# Patient Record
Sex: MALE | Race: WHITE | NOT HISPANIC OR LATINO | Employment: OTHER | URBAN - NONMETROPOLITAN AREA
[De-identification: names, ages, dates, MRNs, and addresses within clinical notes are randomized per-mention and may not be internally consistent; named-entity substitution may affect disease eponyms.]

---

## 2023-07-19 LAB
ANION GAP IN SER/PLAS: 9 MMOL/L (ref 10–20)
CALCIUM (MG/DL) IN SER/PLAS: 9.6 MG/DL (ref 8.6–10.3)
CARBON DIOXIDE, TOTAL (MMOL/L) IN SER/PLAS: 30 MMOL/L (ref 21–32)
CHLORIDE (MMOL/L) IN SER/PLAS: 103 MMOL/L (ref 98–107)
CHOLESTEROL (MG/DL) IN SER/PLAS: 164 MG/DL (ref 0–199)
CHOLESTEROL IN HDL (MG/DL) IN SER/PLAS: 52 MG/DL
CHOLESTEROL/HDL RATIO: 3.2
CREATININE (MG/DL) IN SER/PLAS: 0.89 MG/DL (ref 0.5–1.3)
GFR MALE: >90 ML/MIN/1.73M2
GLUCOSE (MG/DL) IN SER/PLAS: 99 MG/DL (ref 74–99)
LDL: 92 MG/DL (ref 0–99)
POTASSIUM (MMOL/L) IN SER/PLAS: 4.1 MMOL/L (ref 3.5–5.3)
PROSTATE SPECIFIC ANTIGEN,SCREEN: 2.19 NG/ML (ref 0–4)
SODIUM (MMOL/L) IN SER/PLAS: 138 MMOL/L (ref 136–145)
TRIGLYCERIDE (MG/DL) IN SER/PLAS: 98 MG/DL (ref 0–149)
UREA NITROGEN (MG/DL) IN SER/PLAS: 18 MG/DL (ref 6–23)
VLDL: 20 MG/DL (ref 0–40)

## 2023-07-24 ENCOUNTER — TELEPHONE (OUTPATIENT)
Dept: PRIMARY CARE | Facility: CLINIC | Age: 68
End: 2023-07-24

## 2023-07-24 ENCOUNTER — OFFICE VISIT (OUTPATIENT)
Dept: PRIMARY CARE | Facility: CLINIC | Age: 68
End: 2023-07-24
Payer: MEDICARE

## 2023-07-24 VITALS
HEIGHT: 69 IN | SYSTOLIC BLOOD PRESSURE: 118 MMHG | OXYGEN SATURATION: 98 % | BODY MASS INDEX: 21.8 KG/M2 | DIASTOLIC BLOOD PRESSURE: 82 MMHG | WEIGHT: 147.2 LBS | HEART RATE: 71 BPM

## 2023-07-24 DIAGNOSIS — Z12.5 ENCOUNTER FOR SCREENING FOR MALIGNANT NEOPLASM OF PROSTATE: ICD-10-CM

## 2023-07-24 DIAGNOSIS — Z00.00 ROUTINE GENERAL MEDICAL EXAMINATION AT HEALTH CARE FACILITY: Primary | ICD-10-CM

## 2023-07-24 DIAGNOSIS — E78.00 HIGH CHOLESTEROL: ICD-10-CM

## 2023-07-24 DIAGNOSIS — Z00.00 HEALTHCARE MAINTENANCE: ICD-10-CM

## 2023-07-24 DIAGNOSIS — E78.00 HIGH BLOOD CHOLESTEROL: Primary | ICD-10-CM

## 2023-07-24 DIAGNOSIS — F41.9 ANXIETY DISORDER, UNSPECIFIED TYPE: ICD-10-CM

## 2023-07-24 PROCEDURE — 1160F RVW MEDS BY RX/DR IN RCRD: CPT | Performed by: FAMILY MEDICINE

## 2023-07-24 PROCEDURE — 1036F TOBACCO NON-USER: CPT | Performed by: FAMILY MEDICINE

## 2023-07-24 PROCEDURE — 1170F FXNL STATUS ASSESSED: CPT | Performed by: FAMILY MEDICINE

## 2023-07-24 PROCEDURE — 99213 OFFICE O/P EST LOW 20 MIN: CPT | Performed by: FAMILY MEDICINE

## 2023-07-24 PROCEDURE — G0439 PPPS, SUBSEQ VISIT: HCPCS | Performed by: FAMILY MEDICINE

## 2023-07-24 PROCEDURE — 1159F MED LIST DOCD IN RCRD: CPT | Performed by: FAMILY MEDICINE

## 2023-07-24 RX ORDER — PRAVASTATIN SODIUM 40 MG/1
40 TABLET ORAL DAILY
Qty: 30 TABLET | Refills: 3 | Status: SHIPPED | OUTPATIENT
Start: 2023-07-24 | End: 2023-12-07 | Stop reason: SDUPTHER

## 2023-07-24 RX ORDER — PRAVASTATIN SODIUM 40 MG/1
40 TABLET ORAL DAILY
COMMUNITY
End: 2023-07-24 | Stop reason: SDUPTHER

## 2023-07-24 RX ORDER — PAROXETINE HYDROCHLORIDE 20 MG/1
20 TABLET, FILM COATED ORAL DAILY
Qty: 30 TABLET | Refills: 3 | Status: SHIPPED | OUTPATIENT
Start: 2023-07-24 | End: 2023-12-07 | Stop reason: SDUPTHER

## 2023-07-24 RX ORDER — PAROXETINE HYDROCHLORIDE 20 MG/1
20 TABLET, FILM COATED ORAL DAILY
COMMUNITY
End: 2023-07-24 | Stop reason: SDUPTHER

## 2023-07-24 ASSESSMENT — PATIENT HEALTH QUESTIONNAIRE - PHQ9
2. FEELING DOWN, DEPRESSED OR HOPELESS: NOT AT ALL
1. LITTLE INTEREST OR PLEASURE IN DOING THINGS: NOT AT ALL
SUM OF ALL RESPONSES TO PHQ9 QUESTIONS 1 AND 2: 0

## 2023-07-24 ASSESSMENT — ENCOUNTER SYMPTOMS
DEPRESSION: 0
LOSS OF SENSATION IN FEET: 1
OCCASIONAL FEELINGS OF UNSTEADINESS: 0

## 2023-07-24 ASSESSMENT — ACTIVITIES OF DAILY LIVING (ADL)
BATHING: INDEPENDENT
TAKING_MEDICATION: INDEPENDENT
DOING_HOUSEWORK: INDEPENDENT
DRESSING: INDEPENDENT
MANAGING_FINANCES: INDEPENDENT
GROCERY_SHOPPING: INDEPENDENT

## 2023-07-24 NOTE — PROGRESS NOTES
"Subjective   Reason for Visit: Joe Romero is an 68 y.o. male here for a Medicare Wellness visit.     Past Medical, Surgical, and Family History reviewed and updated in chart.    Reviewed all medications by prescribing practitioner or clinical pharmacist (such as prescriptions, OTCs, herbal therapies and supplements) and documented in the medical record.    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   Prostate cancer screening: Screening is current   Colorectal Cancer Screening: screening is current and 7/30/20.  States does not want to repeat  Abdominal Aortic Aneurysm screening: screening is current and 6/9/20.       Patient Care Team:  Andrae Parekh MD as PCP - General     Review of Systems    Objective   Vitals:  /82 (BP Location: Left arm, Patient Position: Sitting)   Pulse 71   Ht 1.753 m (5' 9\")   Wt 66.8 kg (147 lb 3.2 oz)   SpO2 98%   BMI 21.74 kg/m²       Physical Exam  Constitutional:       Appearance: Normal appearance.   HENT:      Head: Normocephalic and atraumatic.   Eyes:      Conjunctiva/sclera: Conjunctivae normal.      Pupils: Pupils are equal, round, and reactive to light.   Cardiovascular:      Rate and Rhythm: Normal rate and regular rhythm.      Heart sounds: Normal heart sounds.   Pulmonary:      Effort: Pulmonary effort is normal.      Breath sounds: Normal breath sounds.   Lymphadenopathy:      Cervical: No " cervical adenopathy.   Skin:     Coloration: Skin is not jaundiced.   Neurological:      General: No focal deficit present.      Mental Status: He is alert and oriented to person, place, and time.   Psychiatric:         Mood and Affect: Mood normal.         Behavior: Behavior normal.         Thought Content: Thought content normal.         Judgment: Judgment normal.         Assessment/Plan   Problem List Items Addressed This Visit    None  Visit Diagnoses       Routine general medical examination at health care facility    -  Primary    Healthcare maintenance        Relevant Orders    Prostate Specific Antigen, Screen    Encounter for screening for malignant neoplasm of prostate        Relevant Orders    Prostate Specific Antigen, Screen    High cholesterol        Relevant Orders    Basic Metabolic Panel

## 2023-07-24 NOTE — PROGRESS NOTES
Patient ID: Joe Romero is a 68 y.o. male.    ProceduresSubjective   Reason for Visit: Joe Romero is an 68 y.o. male here for a Medicare Wellness visit.     Past Medical, Surgical, and Family History reviewed and updated in chart.    Reviewed all medications by prescribing practitioner or clinical pharmacist (such as prescriptions, OTCs, herbal therapies and supplements) and documented in the medical record.    HPI    Patient Care Team:  Andrae Parekh MD as PCP - General     Review of Systems    Objective   Vitals:  There were no vitals taken for this visit.      Physical Exam    Assessment/Plan   Problem List Items Addressed This Visit    None

## 2023-09-14 ENCOUNTER — PATIENT OUTREACH (OUTPATIENT)
Dept: CARE COORDINATION | Facility: CLINIC | Age: 68
End: 2023-09-14
Payer: MEDICARE

## 2023-09-14 ENCOUNTER — DOCUMENTATION (OUTPATIENT)
Dept: PRIMARY CARE | Facility: CLINIC | Age: 68
End: 2023-09-14
Payer: MEDICARE

## 2023-09-14 RX ORDER — FOLIC ACID 1 MG/1
1 TABLET ORAL
COMMUNITY
Start: 2023-09-05 | End: 2023-10-05

## 2023-09-14 RX ORDER — AMIODARONE HYDROCHLORIDE 200 MG/1
200 TABLET ORAL
COMMUNITY
Start: 2023-09-05 | End: 2023-10-05

## 2023-09-14 RX ORDER — ACETAMINOPHEN 325 MG/1
650 TABLET ORAL EVERY 6 HOURS PRN
COMMUNITY
Start: 2023-09-04 | End: 2023-10-04

## 2023-09-14 RX ORDER — DOCUSATE SODIUM 100 MG/1
100 CAPSULE, LIQUID FILLED ORAL
COMMUNITY
Start: 2023-09-05 | End: 2023-10-05

## 2023-09-14 RX ORDER — PANTOPRAZOLE SODIUM 40 MG/1
40 TABLET, DELAYED RELEASE ORAL
COMMUNITY
Start: 2023-09-04 | End: 2023-10-04 | Stop reason: WASHOUT

## 2023-09-14 RX ORDER — METOPROLOL SUCCINATE 25 MG/1
12.5 TABLET, EXTENDED RELEASE ORAL
COMMUNITY
Start: 2023-09-05 | End: 2023-11-22 | Stop reason: SDUPTHER

## 2023-09-14 NOTE — PROGRESS NOTES
Discharge Facility: [facility]  Discharge Diagnosis: PE, DVT  Admission Date: 9/12/23  Discharge Date: 9/13/23    PCP Appointment Date: TBD daughter to call  Specialist Appointment Date:  9/18/23  Hospital Encounter and Summary: Linked   See discharge assessment below for further details    Engagement  Call Start Time: 1100 (9/14/2023 11:08 AM)    Medications  Medications reviewed with patient/caregiver?: Yes (9/14/2023 11:08 AM)  Is the patient having any side effects they believe may be caused by any medication additions or changes?: No (9/14/2023 11:08 AM)  Does the patient have all medications ordered at discharge?: Yes (9/14/2023 11:08 AM)  Care Management Interventions: Provided patient education (9/14/2023 11:08 AM)  Prescription Comments: on eliquis, brilinta, no aspirin (9/14/2023 11:08 AM)  Medication Comments: educated on bleeding risk and watching for dark stools, bleeding gums etc (9/14/2023 11:08 AM)    Appointments  Does the patient have a primary care provider?: Yes (9/14/2023 11:08 AM)  Care Management Interventions: Advised patient to make appointment (9/14/2023 11:08 AM)  Has the patient kept scheduled appointments due by today?: Yes (9/14/2023 11:08 AM)  Care Management Interventions: Educated on importance of keeping appointment (9/14/2023 11:08 AM)    Patient Teaching  Does the patient have access to their discharge instructions?: Yes (9/14/2023 11:08 AM)  Care Management Interventions: Reviewed instructions with patient (9/14/2023 11:08 AM)  What is the patient's perception of their health status since discharge?: Improving (9/14/2023 11:08 AM)  Is the patient/caregiver able to teach back the hierarchy of who to call/visit for symptoms/problems? PCP, Specialist, Home Health nurse, Urgent Care, ED, 911: Yes (9/14/2023 11:08 AM)    Wrap Up  Wrap Up Additional Comments: Patient states his daughter will be calling office to set up appt, doing well and no longer having shortness of  breath, educated on  medications and follow ups (9/14/2023 11:08 AM)  Call End Time: 1105 (9/14/2023 11:08 AM)

## 2023-09-19 ENCOUNTER — DOCUMENTATION (OUTPATIENT)
Dept: PRIMARY CARE | Facility: CLINIC | Age: 68
End: 2023-09-19
Payer: MEDICARE

## 2023-09-19 ENCOUNTER — PATIENT OUTREACH (OUTPATIENT)
Dept: CARE COORDINATION | Facility: CLINIC | Age: 68
End: 2023-09-19
Payer: MEDICARE

## 2023-09-19 NOTE — PROGRESS NOTES
Discharge Facility: [facility]  Discharge Diagnosis: GI bleed  Admission Date: 9/16/23  Discharge Date: 9/18/23    PCP Appointment Date: 9/22/23  Specialist Appointment Date:  10/4/23 cardiology  10/11/23 cardiology  10/17/23 vascular NP  GI pending  Hospital Encounter and Summary: Linked   See discharge assessment below for further details  Engagement  Call Start Time: 1045 (9/19/2023 10:52 AM)    Medications  Medications reviewed with patient/caregiver?: Yes (9/19/2023 10:52 AM)  Is the patient having any side effects they believe may be caused by any medication additions or changes?: No (9/19/2023 10:52 AM)  Does the patient have all medications ordered at discharge?: Yes (9/19/2023 10:52 AM)  Care Management Interventions: Provided patient education (9/19/2023 10:52 AM)  Prescription Comments: Back on elquis restarted the loading dose for 7 days then one tab twice daily (9/19/2023 10:52 AM)  Is the patient taking all medications as directed (includes completed medication regime)?: Yes (9/19/2023 10:52 AM)  Care Management Interventions: Provided patient education (9/19/2023 10:52 AM)  Medication Comments: educated on bleeding risk and watching for dark stools, bleeding gums etc (9/14/2023 11:08 AM)    Appointments  Does the patient have a primary care provider?: Yes (9/19/2023 10:52 AM)  Care Management Interventions: Verified appointment date/time/provider (9/19/2023 10:52 AM)  Has the patient kept scheduled appointments due by today?: Yes (9/14/2023 11:08 AM)  Care Management Interventions: Advised to schedule with specialist (9/19/2023 10:52 AM)    Patient Teaching  Does the patient have access to their discharge instructions?: Yes (9/19/2023 10:52 AM)  Care Management Interventions: Reviewed instructions with patient (9/19/2023 10:52 AM)  What is the patient's perception of their health status since discharge?: Returned to baseline/stable (9/19/2023 10:52 AM)  Is the patient/caregiver able to teach  back the hierarchy of who to call/visit for symptoms/problems? PCP, Specialist, Home Health nurse, Urgent Care, ED, 911: Yes (9/19/2023 10:52 AM)  Patient/Caregiver Education Comments: Aware to continue monitoring for dark stools as he had been, bleeding gums, bruising easily and seek care with any concerns (9/19/2023 10:52 AM)    Wrap Up  Wrap Up Additional Comments: Had EGD and also IVC filter placed 9/17, will see vascular surg for follow up as well as cardiology from previous surgery. Daughter will schedule GI follow up (9/19/2023 10:52 AM)  Call End Time: 1055 (9/19/2023 10:52 AM)

## 2023-09-22 ENCOUNTER — LAB (OUTPATIENT)
Dept: LAB | Facility: LAB | Age: 68
End: 2023-09-22
Payer: MEDICARE

## 2023-09-22 ENCOUNTER — OFFICE VISIT (OUTPATIENT)
Dept: PRIMARY CARE | Facility: CLINIC | Age: 68
End: 2023-09-22
Payer: MEDICARE

## 2023-09-22 VITALS
DIASTOLIC BLOOD PRESSURE: 70 MMHG | BODY MASS INDEX: 22.02 KG/M2 | HEIGHT: 69 IN | OXYGEN SATURATION: 99 % | HEART RATE: 65 BPM | WEIGHT: 148.7 LBS | SYSTOLIC BLOOD PRESSURE: 110 MMHG

## 2023-09-22 DIAGNOSIS — I82.409 DEEP VEIN THROMBOSIS (DVT) WITH PULMONARY EMBOLISM PRESENT ON ADMISSION (MULTI): ICD-10-CM

## 2023-09-22 DIAGNOSIS — K92.1 GASTROINTESTINAL HEMORRHAGE WITH MELENA: Primary | ICD-10-CM

## 2023-09-22 DIAGNOSIS — K92.1 GASTROINTESTINAL HEMORRHAGE WITH MELENA: ICD-10-CM

## 2023-09-22 DIAGNOSIS — I25.10 CORONARY ARTERY DISEASE INVOLVING NATIVE CORONARY ARTERY OF NATIVE HEART WITHOUT ANGINA PECTORIS: ICD-10-CM

## 2023-09-22 DIAGNOSIS — I26.99 DEEP VEIN THROMBOSIS (DVT) WITH PULMONARY EMBOLISM PRESENT ON ADMISSION (MULTI): ICD-10-CM

## 2023-09-22 LAB
ANION GAP IN SER/PLAS: 13 MMOL/L (ref 10–20)
CALCIUM (MG/DL) IN SER/PLAS: 8.2 MG/DL (ref 8.6–10.3)
CARBON DIOXIDE, TOTAL (MMOL/L) IN SER/PLAS: 26 MMOL/L (ref 21–32)
CHLORIDE (MMOL/L) IN SER/PLAS: 103 MMOL/L (ref 98–107)
CREATININE (MG/DL) IN SER/PLAS: 0.79 MG/DL (ref 0.5–1.3)
ERYTHROCYTE DISTRIBUTION WIDTH (RATIO) BY AUTOMATED COUNT: 16.6 % (ref 11.5–14.5)
ERYTHROCYTE MEAN CORPUSCULAR HEMOGLOBIN CONCENTRATION (G/DL) BY AUTOMATED: 29.8 G/DL (ref 32–36)
ERYTHROCYTE MEAN CORPUSCULAR VOLUME (FL) BY AUTOMATED COUNT: 100 FL (ref 80–100)
ERYTHROCYTES (10*6/UL) IN BLOOD BY AUTOMATED COUNT: 3.22 X10E12/L (ref 4.5–5.9)
GFR MALE: >90 ML/MIN/1.73M2
GLUCOSE (MG/DL) IN SER/PLAS: 107 MG/DL (ref 74–99)
HEMATOCRIT (%) IN BLOOD BY AUTOMATED COUNT: 32.2 % (ref 41–52)
HEMOGLOBIN (G/DL) IN BLOOD: 9.6 G/DL (ref 13.5–17.5)
LEUKOCYTES (10*3/UL) IN BLOOD BY AUTOMATED COUNT: 10.4 X10E9/L (ref 4.4–11.3)
PLATELETS (10*3/UL) IN BLOOD AUTOMATED COUNT: 529 X10E9/L (ref 150–450)
POTASSIUM (MMOL/L) IN SER/PLAS: 4.6 MMOL/L (ref 3.5–5.3)
SODIUM (MMOL/L) IN SER/PLAS: 137 MMOL/L (ref 136–145)
UREA NITROGEN (MG/DL) IN SER/PLAS: 14 MG/DL (ref 6–23)

## 2023-09-22 PROCEDURE — 1159F MED LIST DOCD IN RCRD: CPT | Performed by: FAMILY MEDICINE

## 2023-09-22 PROCEDURE — 99214 OFFICE O/P EST MOD 30 MIN: CPT | Performed by: FAMILY MEDICINE

## 2023-09-22 PROCEDURE — 36415 COLL VENOUS BLD VENIPUNCTURE: CPT

## 2023-09-22 PROCEDURE — 1160F RVW MEDS BY RX/DR IN RCRD: CPT | Performed by: FAMILY MEDICINE

## 2023-09-22 PROCEDURE — 1036F TOBACCO NON-USER: CPT | Performed by: FAMILY MEDICINE

## 2023-09-22 PROCEDURE — 80048 BASIC METABOLIC PNL TOTAL CA: CPT

## 2023-09-22 PROCEDURE — 85027 COMPLETE CBC AUTOMATED: CPT

## 2023-09-22 NOTE — PROGRESS NOTES
"Subjective   Patient ID: Joe Romero is a 68 y.o. male who presents for Hospital Follow-up.    HPI     GI bleed   9/16/to 9/19     Patient had acute MI about a month ago had 3 blood vessel bypass.  Was sent home on aspirin  Patient and developed an upper GI bleed.  He has had a inferior vena cava filter placed currently on Eliquis Brilinta not taking aspirin.  He is finally starting to feel better appetite is improving his stools are dark due to the iron that he is taking.    Patient is here with his daughter.  We will recheck his CBC BMP for monitoring of his hemoglobin and history of hyperkalemia.  Poor appetite   Last bm yesterday   Black     Brilinina eliquis   Provoked dvt pe after surgery  treat min of 6 months     Protonix x 2 months  minimum   Off asa   See cardiology Wednesday     Review of Systems      Objective   /70   Pulse 65   Ht 1.753 m (5' 9\")   Wt 67.4 kg (148 lb 11.2 oz)   SpO2 99%   BMI 21.96 kg/m²     Physical Exam  Vitals reviewed.   Constitutional:       General: He is not in acute distress.     Appearance: Normal appearance.   HENT:      Head: Normocephalic and atraumatic.   Eyes:      Conjunctiva/sclera: Conjunctivae normal.   Cardiovascular:      Rate and Rhythm: Normal rate and regular rhythm.      Heart sounds: No murmur heard.  Pulmonary:      Effort: Pulmonary effort is normal.      Breath sounds: Normal breath sounds.   Abdominal:      General: Abdomen is flat. Bowel sounds are normal.      Palpations: Abdomen is soft. There is no mass.   Lymphadenopathy:      Cervical: No cervical adenopathy.   Skin:     General: Skin is warm and dry.      Comments: Cabg incisions well healed    Neurological:      General: No focal deficit present.      Mental Status: He is alert and oriented to person, place, and time.   Psychiatric:         Mood and Affect: Mood normal.         Judgment: Judgment normal.         Assessment/Plan   Diagnoses and all orders for this " visit:  Gastrointestinal hemorrhage with melena  -     Basic Metabolic Panel; Future  -     CBC; Future  Coronary artery disease involving native coronary artery of native heart without angina pectoris  Deep vein thrombosis (DVT) with pulmonary embolism present on admission (CMS/[facility])   Stable

## 2023-09-27 ENCOUNTER — PATIENT OUTREACH (OUTPATIENT)
Dept: CARE COORDINATION | Facility: CLINIC | Age: 68
End: 2023-09-27
Payer: MEDICARE

## 2023-09-27 DIAGNOSIS — Z12.11 COLON CANCER SCREENING: Primary | ICD-10-CM

## 2023-09-27 NOTE — PROGRESS NOTES
Unable to reach patient for call back after patient's follow up appointment with PCP.   KRISTENM with call back number for patient to call if needed   If no voicemail available call attempts x 2 were made to contact the patient to assist with any questions or concerns patient may have.

## 2023-10-31 ENCOUNTER — PATIENT OUTREACH (OUTPATIENT)
Dept: CARE COORDINATION | Facility: CLINIC | Age: 68
End: 2023-10-31
Payer: MEDICARE

## 2023-11-02 ENCOUNTER — TELEPHONE (OUTPATIENT)
Dept: PRIMARY CARE | Facility: CLINIC | Age: 68
End: 2023-11-02
Payer: MEDICARE

## 2023-11-02 DIAGNOSIS — R12 HEART BURN: ICD-10-CM

## 2023-11-02 RX ORDER — PANTOPRAZOLE SODIUM 40 MG/1
TABLET, DELAYED RELEASE ORAL
Qty: 90 TABLET | Refills: 1 | Status: SHIPPED | OUTPATIENT
Start: 2023-11-02 | End: 2024-05-06 | Stop reason: SDUPTHER

## 2023-11-02 RX ORDER — PANTOPRAZOLE SODIUM 40 MG/1
TABLET, DELAYED RELEASE ORAL
COMMUNITY
Start: 2023-10-06 | End: 2023-11-02 | Stop reason: SDUPTHER

## 2023-11-14 ENCOUNTER — TELEPHONE (OUTPATIENT)
Dept: PRIMARY CARE | Facility: CLINIC | Age: 68
End: 2023-11-14
Payer: MEDICARE

## 2023-11-16 ENCOUNTER — TELEPHONE (OUTPATIENT)
Dept: PRIMARY CARE | Facility: CLINIC | Age: 68
End: 2023-11-16
Payer: MEDICARE

## 2023-11-16 DIAGNOSIS — I25.10 CORONARY ARTERY DISEASE INVOLVING NATIVE CORONARY ARTERY OF NATIVE HEART WITHOUT ANGINA PECTORIS: ICD-10-CM

## 2023-11-16 DIAGNOSIS — I82.409 DEEP VEIN THROMBOSIS (DVT) WITH PULMONARY EMBOLISM PRESENT ON ADMISSION (MULTI): ICD-10-CM

## 2023-11-16 DIAGNOSIS — I26.99 DEEP VEIN THROMBOSIS (DVT) WITH PULMONARY EMBOLISM PRESENT ON ADMISSION (MULTI): ICD-10-CM

## 2023-11-22 ENCOUNTER — OFFICE VISIT (OUTPATIENT)
Dept: PRIMARY CARE | Facility: CLINIC | Age: 68
End: 2023-11-22
Payer: MEDICARE

## 2023-11-22 VITALS
SYSTOLIC BLOOD PRESSURE: 118 MMHG | WEIGHT: 156.4 LBS | BODY MASS INDEX: 23.1 KG/M2 | OXYGEN SATURATION: 96 % | DIASTOLIC BLOOD PRESSURE: 70 MMHG | HEART RATE: 55 BPM

## 2023-11-22 DIAGNOSIS — I25.10 CORONARY ARTERY DISEASE INVOLVING NATIVE CORONARY ARTERY OF NATIVE HEART WITHOUT ANGINA PECTORIS: ICD-10-CM

## 2023-11-22 DIAGNOSIS — K92.2 UPPER GI BLEED: Primary | ICD-10-CM

## 2023-11-22 PROCEDURE — 99214 OFFICE O/P EST MOD 30 MIN: CPT | Performed by: FAMILY MEDICINE

## 2023-11-22 PROCEDURE — 1159F MED LIST DOCD IN RCRD: CPT | Performed by: FAMILY MEDICINE

## 2023-11-22 PROCEDURE — 1036F TOBACCO NON-USER: CPT | Performed by: FAMILY MEDICINE

## 2023-11-22 PROCEDURE — 1160F RVW MEDS BY RX/DR IN RCRD: CPT | Performed by: FAMILY MEDICINE

## 2023-11-22 RX ORDER — METOPROLOL SUCCINATE 25 MG/1
12.5 TABLET, EXTENDED RELEASE ORAL
Qty: 45 TABLET | Refills: 1 | Status: SHIPPED | OUTPATIENT
Start: 2023-11-22 | End: 2024-05-22 | Stop reason: SDUPTHER

## 2023-11-22 ASSESSMENT — PATIENT HEALTH QUESTIONNAIRE - PHQ9
1. LITTLE INTEREST OR PLEASURE IN DOING THINGS: NOT AT ALL
SUM OF ALL RESPONSES TO PHQ9 QUESTIONS 1 AND 2: 0
2. FEELING DOWN, DEPRESSED OR HOPELESS: NOT AT ALL

## 2023-11-22 NOTE — PROGRESS NOTES
Subjective   Patient ID: Joe Romero is a 68 y.o. male who presents for Follow-up (PT is here for 2 month OV. Would like PCP to manage met. Rx ).    HPI   GI bleed on asa  9/16/to 9/19 /23     H/o acute MI with 3 blood vessel bypass. 2023.  Efrain gue[place]   No cp still has some ant chest wall pain from incision     Provoked dvt pe after surgery  treat min of 6 months      Protonix x 2 months  minimum   Off asa still  Needs repeat EGD to get IVC out  Last cbc 8.6 11/10/23    Review of Systems    Objective   /70   Pulse 55   Wt 70.9 kg (156 lb 6.4 oz)   SpO2 96%   BMI 23.10 kg/m²     Physical Exam  Constitutional:       Appearance: Normal appearance.   HENT:      Head: Normocephalic and atraumatic.   Eyes:      Conjunctiva/sclera: Conjunctivae normal.      Pupils: Pupils are equal, round, and reactive to light.   Cardiovascular:      Rate and Rhythm: Normal rate and regular rhythm.      Heart sounds: Normal heart sounds.   Pulmonary:      Effort: Pulmonary effort is normal.      Breath sounds: Normal breath sounds.   Lymphadenopathy:      Cervical: No cervical adenopathy.   Skin:     Coloration: Skin is not jaundiced.   Neurological:      General: No focal deficit present.      Mental Status: He is alert and oriented to person, place, and time.   Psychiatric:         Mood and Affect: Mood normal.         Behavior: Behavior normal.         Thought Content: Thought content normal.         Judgment: Judgment normal.         Assessment/Plan   Diagnoses and all orders for this visit:  Upper GI bleed  -     Referral to Gastroenterology; Future  Coronary artery disease involving native coronary artery of native heart without angina pectoris  -     metoprolol succinate XL (Toprol-XL) 25 mg 24 hr tablet; Take 0.5 tablets (12.5 mg) by mouth once daily.

## 2023-11-27 DIAGNOSIS — K92.2 UPPER GI BLEED: Primary | ICD-10-CM

## 2023-11-27 DIAGNOSIS — F41.9 ANXIETY DISORDER, UNSPECIFIED TYPE: ICD-10-CM

## 2023-11-27 DIAGNOSIS — E78.00 HIGH BLOOD CHOLESTEROL: ICD-10-CM

## 2023-11-27 NOTE — TELEPHONE ENCOUNTER
11/27/23 Dr. Benitez's office left a vm stating that he reviewed the patient's chart and he is going to decline scheduling an appointment with this patient.  He stated that the patient needs to schedule with a surgeon.   Referral from 11/22/23 Gastroenterology

## 2023-11-28 NOTE — TELEPHONE ENCOUNTER
Called patients daughter and spoke with her and let her know the General surgeons name and phone number

## 2023-12-07 ENCOUNTER — PATIENT OUTREACH (OUTPATIENT)
Dept: CARE COORDINATION | Facility: CLINIC | Age: 68
End: 2023-12-07
Payer: MEDICARE

## 2023-12-07 RX ORDER — PRAVASTATIN SODIUM 40 MG/1
40 TABLET ORAL DAILY
Qty: 30 TABLET | Refills: 11 | Status: SHIPPED | OUTPATIENT
Start: 2023-12-07 | End: 2024-05-02 | Stop reason: WASHOUT

## 2023-12-07 RX ORDER — PAROXETINE HYDROCHLORIDE 20 MG/1
20 TABLET, FILM COATED ORAL DAILY
Qty: 30 TABLET | Refills: 11 | Status: SHIPPED | OUTPATIENT
Start: 2023-12-07

## 2023-12-07 NOTE — TELEPHONE ENCOUNTER
[facility] is saying they do not have refills on these meds. Please refill as soon as possible, Patient states they have called several times. Please call patient when this is done.    pravastatin (Pravachol) 40 mg tablet [[id number]]    Order Details  Dose: 40 mg Route: oral Frequency: Daily   Dispense Quantity: 30 tablet Refills: 3          Sig: Take 1 tablet (40 mg) by mouth once daily.         Start Date: 07/24/23 End Date: --   Written Date: 07/24/23       PARoxetine (Paxil) 20 mg tablet [[id number]]    Order Details  Dose: 20 mg Route: oral Frequency: Daily   Dispense Quantity: 30 tablet Refills: 3          Sig: Take 1 tablet (20 mg) by mouth once daily.         Start Date: 07/24/23 End Date: --   Written Date: 07/24/23 Rx Expiration Date: 07/23/24        Associated Diagnoses: Anxiety disorder, unspecified type [F41.9]   Original Order: PARoxetine (Paxil) 20 mg tablet [[id number]]

## 2023-12-07 NOTE — TELEPHONE ENCOUNTER
Patient requesting med refills on pravastatin and praoxetine  [facility]  Last OV 11/22/23  No upcoming appointment schedule  Orders pended send if ok

## 2024-01-15 ENCOUNTER — DOCUMENTATION (OUTPATIENT)
Dept: PRIMARY CARE | Facility: CLINIC | Age: 69
End: 2024-01-15
Payer: MEDICARE

## 2024-01-16 ENCOUNTER — PATIENT OUTREACH (OUTPATIENT)
Dept: PRIMARY CARE | Facility: CLINIC | Age: 69
End: 2024-01-16
Payer: MEDICARE

## 2024-01-16 NOTE — PROGRESS NOTES
Discharge Facility: [facility]  Discharge Diagnosis: weakness and abnormal labs  Admission Date: 1/10/2024  Discharge Date: 1/13/2024    PCP Appointment Date: none-task  Specialist Appointment Date:  surgeon 1/16/2024  Vascular 3/11/2024  Hospital Encounter and Summary: Linked   See discharge assessment below for further details    Engagement  Call Start Time: 1351 (1/16/2024  1:51 PM)    Medications  Medications reviewed with patient/caregiver?: Yes (1/16/2024  1:51 PM)  Is the patient having any side effects they believe may be caused by any medication additions or changes?: No (1/16/2024  1:51 PM)  Care Management Interventions: No intervention needed (1/16/2024  1:51 PM)  Prescription Comments: no new meds (1/16/2024  1:51 PM)  Is the patient taking all medications as directed (includes completed medication regime)?: Yes (1/16/2024  1:51 PM)  Medication Comments: see med list (1/16/2024  1:51 PM)    Appointments  Does the patient have a primary care provider?: Yes (1/16/2024  1:51 PM)  Care Management Interventions: Advised patient to make appointment (1/16/2024  1:51 PM)  Has the patient kept scheduled appointments due by today?: Yes (1/16/2024  1:51 PM)  Care Management Interventions: Advised patient to keep appointment (1/16/2024  1:51 PM)    Self Management  What is the home health agency?: none (1/16/2024  1:51 PM)  Has home health visited the patient within 72 hours of discharge?: Not applicable (1/16/2024  1:51 PM)    Patient Teaching  Does the patient have access to their discharge instructions?: Yes (1/16/2024  1:51 PM)  Care Management Interventions: Reviewed instructions with patient (1/16/2024  1:51 PM)  What is the patient's perception of their health status since discharge?: Improving (1/16/2024  1:51 PM)  Is the patient/caregiver able to teach back the hierarchy of who to call/visit for symptoms/problems? PCP, Specialist, Home Health nurse, Urgent Care, ED, 911: Yes (1/16/2024  1:51 PM)    Wrap  Up  Wrap Up Additional Comments: Patient was admitted to [facility] 1/10/2024 for weakness and abnormal labs. Discharged on 1/13/2024 with no new meds. CTS spoke with patient. He stated that he is doing better. No signs of bleeding. He has an appt with general surgery today 1/16/2024. He is aware that if he notices any bleeding he is to stop the Eliquis. Denies any chest pains or shortness of breath. Patient does not want to schedule an appt with PCP at this time. I will task the office staff to let them know he has been discharged. No questions or concerns at this time. (1/16/2024  1:51 PM)  Call End Time: 1356 (1/16/2024  1:51 PM)

## 2024-01-26 ENCOUNTER — PATIENT OUTREACH (OUTPATIENT)
Dept: PRIMARY CARE | Facility: CLINIC | Age: 69
End: 2024-01-26
Payer: MEDICARE

## 2024-02-26 ENCOUNTER — PATIENT OUTREACH (OUTPATIENT)
Dept: PRIMARY CARE | Facility: CLINIC | Age: 69
End: 2024-02-26
Payer: MEDICARE

## 2024-04-01 ENCOUNTER — PATIENT OUTREACH (OUTPATIENT)
Dept: PRIMARY CARE | Facility: CLINIC | Age: 69
End: 2024-04-01
Payer: MEDICARE

## 2024-04-25 DIAGNOSIS — R12 HEART BURN: ICD-10-CM

## 2024-04-25 RX ORDER — PANTOPRAZOLE SODIUM 40 MG/1
TABLET, DELAYED RELEASE ORAL
Qty: 90 TABLET | Refills: 0 | OUTPATIENT
Start: 2024-04-25

## 2024-04-29 ENCOUNTER — DOCUMENTATION (OUTPATIENT)
Dept: PRIMARY CARE | Facility: CLINIC | Age: 69
End: 2024-04-29
Payer: MEDICARE

## 2024-04-30 ENCOUNTER — PATIENT OUTREACH (OUTPATIENT)
Dept: PRIMARY CARE | Facility: CLINIC | Age: 69
End: 2024-04-30
Payer: MEDICARE

## 2024-04-30 NOTE — PROGRESS NOTES
Discharge Facility: [facility]  Discharge Diagnosis: GI bleed  Admission Date: 4/26/2024  Discharge Date: 4/27/2024    PCP Appointment Date: 5/2/2024  Specialist Appointment Date: none  Hospital Encounter and Summary:  not available at this time      Recommended Outpatient Testing  Hold Plavix and Eliquis x 1 week.   Follow up with Dr. Barron.     HOLD blood thinners- Eliquis and Plavix for 1 week. Resume on May 4, 2024     Unsuccessful attempts x 2 to reach patient for PCP follow up. Patient is scheduled for a follow up appt with PCP

## 2024-05-02 ENCOUNTER — OFFICE VISIT (OUTPATIENT)
Dept: PRIMARY CARE | Facility: CLINIC | Age: 69
End: 2024-05-02
Payer: MEDICARE

## 2024-05-02 ENCOUNTER — LAB (OUTPATIENT)
Dept: LAB | Facility: LAB | Age: 69
End: 2024-05-02
Payer: MEDICARE

## 2024-05-02 VITALS
HEART RATE: 57 BPM | WEIGHT: 166.1 LBS | SYSTOLIC BLOOD PRESSURE: 120 MMHG | DIASTOLIC BLOOD PRESSURE: 68 MMHG | BODY MASS INDEX: 24.53 KG/M2 | TEMPERATURE: 97.7 F | OXYGEN SATURATION: 95 %

## 2024-05-02 DIAGNOSIS — Z23 ENCOUNTER FOR ADMINISTRATION OF VACCINE: ICD-10-CM

## 2024-05-02 DIAGNOSIS — T78.3XXA ANGIOEDEMA, INITIAL ENCOUNTER: ICD-10-CM

## 2024-05-02 DIAGNOSIS — I82.409 DEEP VEIN THROMBOSIS (DVT) WITH PULMONARY EMBOLISM PRESENT ON ADMISSION (MULTI): Primary | ICD-10-CM

## 2024-05-02 DIAGNOSIS — K92.2 UPPER GI BLEED: ICD-10-CM

## 2024-05-02 DIAGNOSIS — I82.409 DEEP VEIN THROMBOSIS (DVT) WITH PULMONARY EMBOLISM PRESENT ON ADMISSION (MULTI): ICD-10-CM

## 2024-05-02 DIAGNOSIS — I26.99 DEEP VEIN THROMBOSIS (DVT) WITH PULMONARY EMBOLISM PRESENT ON ADMISSION (MULTI): ICD-10-CM

## 2024-05-02 DIAGNOSIS — I26.99 DEEP VEIN THROMBOSIS (DVT) WITH PULMONARY EMBOLISM PRESENT ON ADMISSION (MULTI): Primary | ICD-10-CM

## 2024-05-02 LAB — D DIMER PPP FEU-MCNC: 1046 NG/ML FEU

## 2024-05-02 PROCEDURE — 90677 PCV20 VACCINE IM: CPT | Performed by: FAMILY MEDICINE

## 2024-05-02 PROCEDURE — 1159F MED LIST DOCD IN RCRD: CPT | Performed by: FAMILY MEDICINE

## 2024-05-02 PROCEDURE — 1036F TOBACCO NON-USER: CPT | Performed by: FAMILY MEDICINE

## 2024-05-02 PROCEDURE — 36415 COLL VENOUS BLD VENIPUNCTURE: CPT

## 2024-05-02 PROCEDURE — 99214 OFFICE O/P EST MOD 30 MIN: CPT | Performed by: FAMILY MEDICINE

## 2024-05-02 PROCEDURE — 85379 FIBRIN DEGRADATION QUANT: CPT

## 2024-05-02 PROCEDURE — G0009 ADMIN PNEUMOCOCCAL VACCINE: HCPCS | Performed by: FAMILY MEDICINE

## 2024-05-02 RX ORDER — ROSUVASTATIN CALCIUM 20 MG/1
20 TABLET, COATED ORAL
COMMUNITY
Start: 2024-04-01 | End: 2025-04-01

## 2024-05-02 RX ORDER — NITROGLYCERIN 0.4 MG/1
0.4 TABLET SUBLINGUAL EVERY 5 MIN PRN
COMMUNITY
Start: 2024-04-01 | End: 2025-04-01

## 2024-05-02 RX ORDER — FERROUS GLUCONATE 325 MG
38 TABLET ORAL
Qty: 30 TABLET | Refills: 11 | Status: SHIPPED | OUTPATIENT
Start: 2024-05-02 | End: 2025-05-02

## 2024-05-02 RX ORDER — CLOPIDOGREL BISULFATE 75 MG/1
75 TABLET ORAL
COMMUNITY
Start: 2024-04-01 | End: 2025-04-01

## 2024-05-02 RX ORDER — ASCORBIC ACID 500 MG
500 TABLET ORAL DAILY
Qty: 30 TABLET | Refills: 11 | Status: SHIPPED | OUTPATIENT
Start: 2024-05-02 | End: 2025-05-02

## 2024-05-02 RX ORDER — LISINOPRIL 5 MG/1
5 TABLET ORAL
COMMUNITY
Start: 2024-04-01 | End: 2025-04-01

## 2024-05-02 RX ORDER — PREDNISONE 50 MG/1
50 TABLET ORAL ONCE
Qty: 1 TABLET | Refills: 1 | Status: SHIPPED | OUTPATIENT
Start: 2024-05-02 | End: 2024-05-02

## 2024-05-02 NOTE — PROGRESS NOTES
Subjective   Patient ID: Joe Romero is a 68 y.o. male who presents for Hospital Follow-up (4/26/24 [facility], Dr Barron, GI bleed and anemia, patient to resume Plavix and Eliquis on May 4.  Had follow up with Dr Barron yesterday.).    HPI    Family thought he was Jaundiced and went to ED but  anemic last week and got 1 unit of prbcs  Holding the plavix and eliquis    Normal bilirubin   States no bowel changes     Dvt/PE  left leg in 9/ 2023  has completed 6 months of treatment     Will D dimer today in 1 month     Cabg AND S/P  MI will hold plavix until hg 11.0   Brlinita stop last month per cardiology     Angioedema      No breathing problems lip and tongue swell         Gen surgery follow up  note yesterday     1. Gastrointestinal hemorrhage, unspecified gastrointestinal hemorrhage type Ambulatory referral to Gastroenterology     2. Anemia, unspecified type Ambulatory referral to Gastroenterology     3. Adenomatous duodenal polyp     4. ST elevation myocardial infarction involving right coronary artery (HCC)     5. Coronary artery disease involving native coronary artery of native heart without angina pectoris     6. Pulmonary embolism on left (HCC)     7. Acute deep vein thrombosis (DVT) of femoral vein of left lower extremity (HCC)     8. S/P CABG (coronary artery bypass graft)     9. Family history of upper GI bleeding     10. Long term current use of anticoagulant     11. Status post coronary artery bypass grafting     12. History of arteriovenous malformation     13. History of DVT (deep vein thrombosis)     14. Presence of IVC filter     15. History of pulmonary embolism       ASSESSMENT: Ongoing anemia in a patient whose Plavix and Eliquis remains on hold due to a recent hemoglobin of 8.1 hematocrit of 28.2 status post EGD January 26, 2024 which was normal although previously ablation of upper GI AVMs was accomplished in [place]. Additionally on January 26, 2024 he underwent a colonoscopy and  underwent argon beam coagulation of 2 cecal AVMs with clipping as well as removal of rectal polyp.     He has not had any hematemesis, melena or bright red blood per rectum. At this point in time I am hard pressed to recommend either repeat EGD or colonoscopy but do feel he may benefit from a small bowel capsule endoscopy given his history of AVMs a potential for small bowel lesions. We have placed a referral through [facility] to get that accomplished although it will be interesting to see if they suggest repeat endoscopies.    PLAN: He is to follow-up with Dr. Parekh tomorrow as scheduled. We have scheduled him to undergo capsule endoscopy which cannot be performed here in [place] and therefore a referral has been made [facility] in [place]. He is to follow-up with me in 3 months time. At that time we can consider repeat EGD or colonoscopies should that be logical at that point in time.     Review of Systems    Objective   /68 (BP Location: Right arm, Patient Position: Sitting)   Pulse 57   Temp 36.5 °C (97.7 °F)   Wt 75.3 kg (166 lb 1.6 oz)   SpO2 95%   BMI 24.53 kg/m²     Physical Exam  Vitals reviewed.   Constitutional:       General: He is not in acute distress.     Appearance: Normal appearance.   HENT:      Head: Normocephalic and atraumatic.   Eyes:      Conjunctiva/sclera: Conjunctivae normal.      Pupils: Pupils are equal, round, and reactive to light.   Cardiovascular:      Rate and Rhythm: Normal rate and regular rhythm.      Heart sounds: Normal heart sounds. No murmur heard.  Pulmonary:      Effort: Pulmonary effort is normal.      Breath sounds: Normal breath sounds.   Abdominal:      General: Abdomen is flat. Bowel sounds are normal.      Palpations: Abdomen is soft. There is no mass.   Lymphadenopathy:      Cervical: No cervical adenopathy.   Skin:     General: Skin is warm and dry.      Coloration: Skin is not jaundiced.   Neurological:      General: No focal deficit present.      Mental Status: He  is alert and oriented to person, place, and time.   Psychiatric:         Mood and Affect: Mood normal.         Behavior: Behavior normal.         Thought Content: Thought content normal.         Judgment: Judgment normal.         Assessment/Plan   Diagnoses and all orders for this visit:  Deep vein thrombosis (DVT) with pulmonary embolism present on admission (Multi)  -     D-dimer, quantitative; Future  -     D-dimer, quantitative; Future  Upper GI bleed  -     ferrous gluconate (Fergon) 324 (38 Fe) mg tablet; Take 1 tablet (38 mg of iron) by mouth once daily with breakfast.  -     ascorbic acid (Vitamin C) 500 mg tablet; Take 1 tablet (500 mg) by mouth once daily.  -     CBC and Auto Differential; Future  Angioedema, initial encounter  -     predniSONE (Deltasone) 50 mg tablet; Take 1 tablet (50 mg) by mouth 1 time for 1 dose.     Plan no anticoag until hg 11.0 then restart plavix with serial cbc at least monthly   Appear to have very slow gi bleed   If d dimer is elevated  over baseline will refer to hem in 1 month for consisideration of DOAC

## 2024-05-06 DIAGNOSIS — R12 HEART BURN: ICD-10-CM

## 2024-05-06 RX ORDER — PANTOPRAZOLE SODIUM 40 MG/1
TABLET, DELAYED RELEASE ORAL
Qty: 90 TABLET | Refills: 1 | Status: SHIPPED | OUTPATIENT
Start: 2024-05-06

## 2024-05-06 NOTE — TELEPHONE ENCOUNTER
Patient daughter called in and would like a refill on his pantoprazole (ProtoNix) 40 mg EC tablet called into [facility] pharmacy     Thank you

## 2024-05-06 NOTE — TELEPHONE ENCOUNTER
Medication Refill Request    Medication:  pantoprazole    Pharmacy:  [facility]    Last OV:  5/2/24  Upcoming appointment:  5/22/24    ORDER PENDED

## 2024-05-07 ENCOUNTER — PATIENT OUTREACH (OUTPATIENT)
Dept: PRIMARY CARE | Facility: CLINIC | Age: 69
End: 2024-05-07
Payer: MEDICARE

## 2024-05-07 NOTE — PROGRESS NOTES
Unable to reach patient for call back after patient's follow up appointment with PCP. 5/2/2024  M with call back number for patient to call if needed   If no voicemail available call attempts x 2 were made to contact the patient to assist with any questions or concerns patient may have.

## 2024-05-17 ENCOUNTER — LAB (OUTPATIENT)
Dept: LAB | Facility: LAB | Age: 69
End: 2024-05-17
Payer: MEDICARE

## 2024-05-17 ENCOUNTER — APPOINTMENT (OUTPATIENT)
Dept: LAB | Facility: LAB | Age: 69
End: 2024-05-17
Payer: MEDICARE

## 2024-05-17 DIAGNOSIS — K92.2 UPPER GI BLEED: ICD-10-CM

## 2024-05-17 DIAGNOSIS — Z00.00 HEALTHCARE MAINTENANCE: ICD-10-CM

## 2024-05-17 DIAGNOSIS — Z12.5 ENCOUNTER FOR SCREENING FOR MALIGNANT NEOPLASM OF PROSTATE: ICD-10-CM

## 2024-05-17 DIAGNOSIS — E78.00 HIGH CHOLESTEROL: ICD-10-CM

## 2024-05-17 DIAGNOSIS — I82.409 DEEP VEIN THROMBOSIS (DVT) WITH PULMONARY EMBOLISM PRESENT ON ADMISSION (MULTI): ICD-10-CM

## 2024-05-17 DIAGNOSIS — I26.99 DEEP VEIN THROMBOSIS (DVT) WITH PULMONARY EMBOLISM PRESENT ON ADMISSION (MULTI): ICD-10-CM

## 2024-05-17 LAB
ANION GAP SERPL CALC-SCNC: 13 MMOL/L (ref 10–20)
BASOPHILS # BLD AUTO: 0.02 X10*3/UL (ref 0–0.1)
BASOPHILS NFR BLD AUTO: 0.4 %
BUN SERPL-MCNC: 14 MG/DL (ref 6–23)
CALCIUM SERPL-MCNC: 8.7 MG/DL (ref 8.6–10.3)
CHLORIDE SERPL-SCNC: 107 MMOL/L (ref 98–107)
CO2 SERPL-SCNC: 25 MMOL/L (ref 21–32)
CREAT SERPL-MCNC: 0.98 MG/DL (ref 0.5–1.3)
D DIMER PPP FEU-MCNC: 600 NG/ML FEU
EGFRCR SERPLBLD CKD-EPI 2021: 83 ML/MIN/1.73M*2
EOSINOPHIL # BLD AUTO: 0.17 X10*3/UL (ref 0–0.7)
EOSINOPHIL NFR BLD AUTO: 3.8 %
ERYTHROCYTE [DISTWIDTH] IN BLOOD BY AUTOMATED COUNT: 21.8 % (ref 11.5–14.5)
GLUCOSE SERPL-MCNC: 125 MG/DL (ref 74–99)
HCT VFR BLD AUTO: 35.9 % (ref 41–52)
HGB BLD-MCNC: 9.7 G/DL (ref 13.5–17.5)
IMM GRANULOCYTES # BLD AUTO: 0.02 X10*3/UL (ref 0–0.7)
IMM GRANULOCYTES NFR BLD AUTO: 0.4 % (ref 0–0.9)
LYMPHOCYTES # BLD AUTO: 1.39 X10*3/UL (ref 1.2–4.8)
LYMPHOCYTES NFR BLD AUTO: 30.9 %
MCH RBC QN AUTO: 23.5 PG (ref 26–34)
MCHC RBC AUTO-ENTMCNC: 27 G/DL (ref 32–36)
MCV RBC AUTO: 87 FL (ref 80–100)
MONOCYTES # BLD AUTO: 0.53 X10*3/UL (ref 0.1–1)
MONOCYTES NFR BLD AUTO: 11.8 %
NEUTROPHILS # BLD AUTO: 2.37 X10*3/UL (ref 1.2–7.7)
NEUTROPHILS NFR BLD AUTO: 52.7 %
NRBC BLD-RTO: 0 /100 WBCS (ref 0–0)
PLATELET # BLD AUTO: 272 X10*3/UL (ref 150–450)
POTASSIUM SERPL-SCNC: 4.8 MMOL/L (ref 3.5–5.3)
PSA SERPL-MCNC: 2.12 NG/ML
RBC # BLD AUTO: 4.13 X10*6/UL (ref 4.5–5.9)
SODIUM SERPL-SCNC: 140 MMOL/L (ref 136–145)
WBC # BLD AUTO: 4.5 X10*3/UL (ref 4.4–11.3)

## 2024-05-17 PROCEDURE — 85379 FIBRIN DEGRADATION QUANT: CPT

## 2024-05-17 PROCEDURE — 85025 COMPLETE CBC W/AUTO DIFF WBC: CPT

## 2024-05-17 PROCEDURE — 80048 BASIC METABOLIC PNL TOTAL CA: CPT

## 2024-05-17 PROCEDURE — 36415 COLL VENOUS BLD VENIPUNCTURE: CPT

## 2024-05-17 PROCEDURE — G0103 PSA SCREENING: HCPCS

## 2024-05-20 ENCOUNTER — TELEPHONE (OUTPATIENT)
Dept: PRIMARY CARE | Facility: CLINIC | Age: 69
End: 2024-05-20
Payer: MEDICARE

## 2024-05-20 DIAGNOSIS — K92.2 UPPER GI BLEED: Primary | ICD-10-CM

## 2024-05-20 NOTE — TELEPHONE ENCOUNTER
----- Message from Andrae Parekh MD sent at 5/20/2024  9:21 AM EDT -----  Let pt know hs labs look better repeat labs in 1 month.

## 2024-05-21 DIAGNOSIS — I25.10 CORONARY ARTERY DISEASE INVOLVING NATIVE CORONARY ARTERY OF NATIVE HEART WITHOUT ANGINA PECTORIS: ICD-10-CM

## 2024-05-21 RX ORDER — METOPROLOL SUCCINATE 25 MG/1
25 TABLET, EXTENDED RELEASE ORAL DAILY
Qty: 45 TABLET | Refills: 0 | OUTPATIENT
Start: 2024-05-21

## 2024-05-22 ENCOUNTER — OFFICE VISIT (OUTPATIENT)
Dept: PRIMARY CARE | Facility: CLINIC | Age: 69
End: 2024-05-22
Payer: MEDICARE

## 2024-05-22 VITALS
HEIGHT: 69 IN | WEIGHT: 164 LBS | SYSTOLIC BLOOD PRESSURE: 136 MMHG | DIASTOLIC BLOOD PRESSURE: 78 MMHG | HEART RATE: 48 BPM | OXYGEN SATURATION: 97 % | BODY MASS INDEX: 24.29 KG/M2

## 2024-05-22 DIAGNOSIS — Z00.00 ROUTINE GENERAL MEDICAL EXAMINATION AT HEALTH CARE FACILITY: Primary | ICD-10-CM

## 2024-05-22 DIAGNOSIS — E78.00 HIGH BLOOD CHOLESTEROL: ICD-10-CM

## 2024-05-22 DIAGNOSIS — I25.10 CORONARY ARTERY DISEASE INVOLVING NATIVE CORONARY ARTERY OF NATIVE HEART WITHOUT ANGINA PECTORIS: ICD-10-CM

## 2024-05-22 DIAGNOSIS — F41.9 ANXIETY DISORDER, UNSPECIFIED TYPE: ICD-10-CM

## 2024-05-22 DIAGNOSIS — K92.2 UPPER GI BLEED: ICD-10-CM

## 2024-05-22 DIAGNOSIS — F33.42 RECURRENT MAJOR DEPRESSIVE DISORDER, IN FULL REMISSION (CMS-HCC): ICD-10-CM

## 2024-05-22 PROCEDURE — 1160F RVW MEDS BY RX/DR IN RCRD: CPT | Performed by: FAMILY MEDICINE

## 2024-05-22 PROCEDURE — 1170F FXNL STATUS ASSESSED: CPT | Performed by: FAMILY MEDICINE

## 2024-05-22 PROCEDURE — 99214 OFFICE O/P EST MOD 30 MIN: CPT | Performed by: FAMILY MEDICINE

## 2024-05-22 PROCEDURE — 1036F TOBACCO NON-USER: CPT | Performed by: FAMILY MEDICINE

## 2024-05-22 PROCEDURE — 1159F MED LIST DOCD IN RCRD: CPT | Performed by: FAMILY MEDICINE

## 2024-05-22 PROCEDURE — G0439 PPPS, SUBSEQ VISIT: HCPCS | Performed by: FAMILY MEDICINE

## 2024-05-22 PROCEDURE — 1124F ACP DISCUSS-NO DSCNMKR DOCD: CPT | Performed by: FAMILY MEDICINE

## 2024-05-22 RX ORDER — METOPROLOL SUCCINATE 25 MG/1
12.5 TABLET, EXTENDED RELEASE ORAL
Qty: 45 TABLET | Refills: 1 | Status: SHIPPED | OUTPATIENT
Start: 2024-05-22 | End: 2024-11-18

## 2024-05-22 ASSESSMENT — ACTIVITIES OF DAILY LIVING (ADL)
DOING_HOUSEWORK: INDEPENDENT
TAKING_MEDICATION: INDEPENDENT
GROCERY_SHOPPING: INDEPENDENT
MANAGING_FINANCES: INDEPENDENT
DRESSING: INDEPENDENT
BATHING: INDEPENDENT

## 2024-05-22 NOTE — PROGRESS NOTES
"Subjective   Reason for Visit: Joe Romero is an 68 y.o. male here for a Medicare Wellness visit.     Past Medical, Surgical, and Family History reviewed and updated in chart.    Reviewed all medications by prescribing practitioner or clinical pharmacist (such as prescriptions, OTCs, herbal therapies and supplements) and documented in the medical record.    HPI  HPOA no   Living will  no   Code full     HTN  in the am 170/90     AM  over 140/90 in the am but by evening normal     Was on lisinopril by cardiology stopped due to dizziness and lightheadedness     Pt states feels the best he has in 3 years         Holding the plavix and eliquis    Normal bilirubin   States no bowel changes   Since hg is increasing will restart plavix cont to monitor   H and h monthly   GI bleed will get capsule endoscopy      Dvt/PE  left leg in 9/ 2023  has completed 6 months of treatment     Will D dimer today in 1 month   D dimer age adjusted normal is 700   Hg up to 9.7   On iron         Cabg AND S/P  MI will hold plavix until hg 11.0   Brlinita stop last month per cardiology      Angioedema      No breathing problems lip and tongue swell     Anxiety/ Deression doing well on paxil  does not want  to decrease   Here with dtr who agrees    Patient Care Team:  Andrae Parekh MD as PCP - General  Andrae Parekh MD as PCP - [employer] PCP  Michaelle Marinelli CMA as Care Manager (Case Management)     Review of Systems    Objective   Vitals:  /78   Pulse (!) 48   Ht 1.753 m (5' 9\")   Wt 74.4 kg (164 lb)   SpO2 97%   BMI 24.22 kg/m²       Physical Exam  Vitals reviewed.   Constitutional:       Appearance: Normal appearance.   HENT:      Head: Normocephalic and atraumatic.   Eyes:      Conjunctiva/sclera: Conjunctivae normal.   Cardiovascular:      Rate and Rhythm: Normal rate and regular rhythm.   Pulmonary:      Effort: Pulmonary effort is normal.      Breath sounds: Normal breath sounds.   Musculoskeletal: "      Cervical back: Neck supple.   Skin:     General: Skin is warm and dry.   Neurological:      General: No focal deficit present.      Mental Status: He is alert and oriented to person, place, and time.   Psychiatric:         Mood and Affect: Mood normal.         Behavior: Behavior normal.         Thought Content: Thought content normal.         Judgment: Judgment normal.         Assessment/Plan   Problem List Items Addressed This Visit       Coronary artery disease involving native coronary artery of native heart without angina pectoris    Relevant Medications    metoprolol succinate XL (Toprol-XL) 25 mg 24 hr tablet    Recurrent major depressive disorder, in full remission (CMS-[facility])     Other Visit Diagnoses       Routine general medical examination at health care facility    -  Primary    Upper GI bleed        Anxiety disorder, unspecified type        High blood cholesterol

## 2024-05-30 ENCOUNTER — TELEPHONE (OUTPATIENT)
Dept: PRIMARY CARE | Facility: CLINIC | Age: 69
End: 2024-05-30
Payer: MEDICARE

## 2024-05-30 NOTE — TELEPHONE ENCOUNTER
His heart rate is too low so would not increase the dose of metoprolol however he should double his lisinopril   I have him  currently on  5 mg increase to 10 mg

## 2024-05-30 NOTE — TELEPHONE ENCOUNTER
JANE CALLED IN TO SAY THAT HIS BP IS STILL RUNNING HIGH. DO YOU WANT HIM TO TAKE A FULL PILL? PLEASE CALL HER AT [phone]. SHE SAID IT HAS BEEN STAYING  . THANK YOU.

## 2024-06-04 ENCOUNTER — APPOINTMENT (OUTPATIENT)
Dept: PRIMARY CARE | Facility: CLINIC | Age: 69
End: 2024-06-04
Payer: MEDICARE

## 2024-06-06 ENCOUNTER — PATIENT OUTREACH (OUTPATIENT)
Dept: PRIMARY CARE | Facility: CLINIC | Age: 69
End: 2024-06-06
Payer: MEDICARE

## 2024-06-06 LAB — NONINV COLON CA DNA+OCC BLD SCRN STL QL: NEGATIVE

## 2024-07-01 ENCOUNTER — LAB (OUTPATIENT)
Dept: LAB | Facility: LAB | Age: 69
End: 2024-07-01
Payer: MEDICARE

## 2024-07-01 DIAGNOSIS — K92.2 UPPER GI BLEED: ICD-10-CM

## 2024-07-01 LAB
BASOPHILS # BLD AUTO: 0.02 X10*3/UL (ref 0–0.1)
BASOPHILS NFR BLD AUTO: 0.3 %
D DIMER PPP FEU-MCNC: 1003 NG/ML FEU
EOSINOPHIL # BLD AUTO: 0.24 X10*3/UL (ref 0–0.7)
EOSINOPHIL NFR BLD AUTO: 4.1 %
ERYTHROCYTE [DISTWIDTH] IN BLOOD BY AUTOMATED COUNT: 22.5 % (ref 11.5–14.5)
HCT VFR BLD AUTO: 40.8 % (ref 41–52)
HGB BLD-MCNC: 12.4 G/DL (ref 13.5–17.5)
HYPOCHROMIA BLD QL SMEAR: NORMAL
IMM GRANULOCYTES # BLD AUTO: 0.01 X10*3/UL (ref 0–0.7)
IMM GRANULOCYTES NFR BLD AUTO: 0.2 % (ref 0–0.9)
LYMPHOCYTES # BLD AUTO: 1.95 X10*3/UL (ref 1.2–4.8)
LYMPHOCYTES NFR BLD AUTO: 33.1 %
MCH RBC QN AUTO: 28.2 PG (ref 26–34)
MCHC RBC AUTO-ENTMCNC: 30.4 G/DL (ref 32–36)
MCV RBC AUTO: 93 FL (ref 80–100)
MONOCYTES # BLD AUTO: 0.73 X10*3/UL (ref 0.1–1)
MONOCYTES NFR BLD AUTO: 12.4 %
NEUTROPHILS # BLD AUTO: 2.94 X10*3/UL (ref 1.2–7.7)
NEUTROPHILS NFR BLD AUTO: 49.9 %
NRBC BLD-RTO: 0 /100 WBCS (ref 0–0)
OVALOCYTES BLD QL SMEAR: NORMAL
PLATELET # BLD AUTO: 208 X10*3/UL (ref 150–450)
RBC # BLD AUTO: 4.39 X10*6/UL (ref 4.5–5.9)
RBC MORPH BLD: NORMAL
SCHISTOCYTES BLD QL SMEAR: NORMAL
WBC # BLD AUTO: 5.9 X10*3/UL (ref 4.4–11.3)

## 2024-07-01 PROCEDURE — 85025 COMPLETE CBC W/AUTO DIFF WBC: CPT

## 2024-07-01 PROCEDURE — 85379 FIBRIN DEGRADATION QUANT: CPT

## 2024-07-01 PROCEDURE — 36415 COLL VENOUS BLD VENIPUNCTURE: CPT

## 2024-07-02 DIAGNOSIS — I82.409 DEEP VEIN THROMBOSIS (DVT) WITH PULMONARY EMBOLISM PRESENT ON ADMISSION (MULTI): Primary | ICD-10-CM

## 2024-07-02 DIAGNOSIS — I26.99 DEEP VEIN THROMBOSIS (DVT) WITH PULMONARY EMBOLISM PRESENT ON ADMISSION (MULTI): Primary | ICD-10-CM

## 2024-07-02 NOTE — RESULT ENCOUNTER NOTE
Pt's hemoglobin is up to 12.4 and is much better  Clarify that patient is taking plavix if he is not needs to restart  His d dimer is elevated and at risk for blood clot   However lets go another month and recheck labs and decide then if we need to restart eliquis.

## 2024-07-09 ENCOUNTER — PATIENT OUTREACH (OUTPATIENT)
Dept: PRIMARY CARE | Facility: CLINIC | Age: 69
End: 2024-07-09
Payer: MEDICARE

## 2024-07-26 ENCOUNTER — APPOINTMENT (OUTPATIENT)
Dept: PRIMARY CARE | Facility: CLINIC | Age: 69
End: 2024-07-26
Payer: MEDICARE

## 2024-07-29 ENCOUNTER — LAB (OUTPATIENT)
Dept: LAB | Facility: LAB | Age: 69
End: 2024-07-29
Payer: MEDICARE

## 2024-07-29 DIAGNOSIS — I26.99 DEEP VEIN THROMBOSIS (DVT) WITH PULMONARY EMBOLISM PRESENT ON ADMISSION (MULTI): Primary | ICD-10-CM

## 2024-07-29 DIAGNOSIS — I82.409 DEEP VEIN THROMBOSIS (DVT) WITH PULMONARY EMBOLISM PRESENT ON ADMISSION (MULTI): Primary | ICD-10-CM

## 2024-07-29 DIAGNOSIS — I82.409 DEEP VEIN THROMBOSIS (DVT) WITH PULMONARY EMBOLISM PRESENT ON ADMISSION (MULTI): ICD-10-CM

## 2024-07-29 DIAGNOSIS — I26.99 DEEP VEIN THROMBOSIS (DVT) WITH PULMONARY EMBOLISM PRESENT ON ADMISSION (MULTI): ICD-10-CM

## 2024-07-29 LAB
D DIMER PPP FEU-MCNC: 846 NG/ML FEU
HGB BLD-MCNC: 12.8 G/DL (ref 13.5–17.5)

## 2024-07-29 PROCEDURE — 36415 COLL VENOUS BLD VENIPUNCTURE: CPT

## 2024-07-29 PROCEDURE — 85018 HEMOGLOBIN: CPT

## 2024-07-29 PROCEDURE — 85379 FIBRIN DEGRADATION QUANT: CPT

## 2024-08-01 ENCOUNTER — TELEPHONE (OUTPATIENT)
Dept: PRIMARY CARE | Facility: CLINIC | Age: 69
End: 2024-08-01
Payer: MEDICARE

## 2024-08-01 NOTE — TELEPHONE ENCOUNTER
You are less anemic.  Your D- dimer is less but still above 700.  Lets recheck before your next appointment.  Continue the plavix.     A letter was sent this week

## 2024-10-31 DIAGNOSIS — R12 HEART BURN: ICD-10-CM

## 2024-10-31 RX ORDER — PANTOPRAZOLE SODIUM 40 MG/1
TABLET, DELAYED RELEASE ORAL
Qty: 90 TABLET | Refills: 1 | Status: SHIPPED | OUTPATIENT
Start: 2024-10-31

## 2024-11-12 DIAGNOSIS — I25.10 CORONARY ARTERY DISEASE INVOLVING NATIVE CORONARY ARTERY OF NATIVE HEART WITHOUT ANGINA PECTORIS: ICD-10-CM

## 2024-11-12 RX ORDER — METOPROLOL SUCCINATE 25 MG/1
25 TABLET, EXTENDED RELEASE ORAL DAILY
Qty: 45 TABLET | Refills: 0 | OUTPATIENT
Start: 2024-11-12

## 2024-11-19 ENCOUNTER — LAB (OUTPATIENT)
Dept: LAB | Facility: LAB | Age: 69
End: 2024-11-19
Payer: MEDICARE

## 2024-11-19 DIAGNOSIS — I82.409 DEEP VEIN THROMBOSIS (DVT) WITH PULMONARY EMBOLISM PRESENT ON ADMISSION (MULTI): ICD-10-CM

## 2024-11-19 DIAGNOSIS — I26.99 DEEP VEIN THROMBOSIS (DVT) WITH PULMONARY EMBOLISM PRESENT ON ADMISSION (MULTI): ICD-10-CM

## 2024-11-19 LAB
BASOPHILS # BLD AUTO: 0.02 X10*3/UL (ref 0–0.1)
BASOPHILS NFR BLD AUTO: 0.4 %
D DIMER PPP FEU-MCNC: 595 NG/ML FEU
EOSINOPHIL # BLD AUTO: 0.22 X10*3/UL (ref 0–0.4)
EOSINOPHIL NFR BLD AUTO: 4.2 %
ERYTHROCYTE [DISTWIDTH] IN BLOOD BY AUTOMATED COUNT: 12.4 % (ref 11.5–14.5)
HCT VFR BLD AUTO: 42.6 % (ref 41–52)
HGB BLD-MCNC: 13.5 G/DL (ref 13.5–17.5)
IMM GRANULOCYTES # BLD AUTO: 0.01 X10*3/UL (ref 0–0.5)
IMM GRANULOCYTES NFR BLD AUTO: 0.2 % (ref 0–0.9)
LYMPHOCYTES # BLD AUTO: 1.8 X10*3/UL (ref 0.8–3)
LYMPHOCYTES NFR BLD AUTO: 34.5 %
MCH RBC QN AUTO: 31.8 PG (ref 26–34)
MCHC RBC AUTO-ENTMCNC: 31.7 G/DL (ref 32–36)
MCV RBC AUTO: 101 FL (ref 80–100)
MONOCYTES # BLD AUTO: 0.75 X10*3/UL (ref 0.05–0.8)
MONOCYTES NFR BLD AUTO: 14.4 %
NEUTROPHILS # BLD AUTO: 2.41 X10*3/UL (ref 1.6–5.5)
NEUTROPHILS NFR BLD AUTO: 46.3 %
NRBC BLD-RTO: 0 /100 WBCS (ref 0–0)
PLATELET # BLD AUTO: 203 X10*3/UL (ref 150–450)
RBC # BLD AUTO: 4.24 X10*6/UL (ref 4.5–5.9)
WBC # BLD AUTO: 5.2 X10*3/UL (ref 4.4–11.3)

## 2024-11-19 PROCEDURE — 36415 COLL VENOUS BLD VENIPUNCTURE: CPT

## 2024-11-19 PROCEDURE — 85379 FIBRIN DEGRADATION QUANT: CPT

## 2024-11-19 PROCEDURE — 85025 COMPLETE CBC W/AUTO DIFF WBC: CPT

## 2024-11-25 ENCOUNTER — APPOINTMENT (OUTPATIENT)
Dept: PRIMARY CARE | Facility: CLINIC | Age: 69
End: 2024-11-25
Payer: MEDICARE

## 2024-11-26 ENCOUNTER — APPOINTMENT (OUTPATIENT)
Dept: PRIMARY CARE | Facility: CLINIC | Age: 69
End: 2024-11-26
Payer: MEDICARE

## 2024-11-26 VITALS
OXYGEN SATURATION: 94 % | BODY MASS INDEX: 25.06 KG/M2 | SYSTOLIC BLOOD PRESSURE: 138 MMHG | WEIGHT: 169.2 LBS | HEART RATE: 58 BPM | DIASTOLIC BLOOD PRESSURE: 80 MMHG | HEIGHT: 69 IN

## 2024-11-26 DIAGNOSIS — I25.10 CORONARY ARTERY DISEASE INVOLVING NATIVE CORONARY ARTERY OF NATIVE HEART WITHOUT ANGINA PECTORIS: ICD-10-CM

## 2024-11-26 DIAGNOSIS — I26.99 DEEP VEIN THROMBOSIS (DVT) WITH PULMONARY EMBOLISM PRESENT ON ADMISSION (MULTI): Primary | ICD-10-CM

## 2024-11-26 DIAGNOSIS — D64.9 ANEMIA, UNSPECIFIED TYPE: ICD-10-CM

## 2024-11-26 DIAGNOSIS — I82.409 DEEP VEIN THROMBOSIS (DVT) WITH PULMONARY EMBOLISM PRESENT ON ADMISSION (MULTI): Primary | ICD-10-CM

## 2024-11-26 PROCEDURE — 1160F RVW MEDS BY RX/DR IN RCRD: CPT

## 2024-11-26 PROCEDURE — 3008F BODY MASS INDEX DOCD: CPT

## 2024-11-26 PROCEDURE — 1036F TOBACCO NON-USER: CPT

## 2024-11-26 PROCEDURE — 1159F MED LIST DOCD IN RCRD: CPT

## 2024-11-26 PROCEDURE — 99213 OFFICE O/P EST LOW 20 MIN: CPT

## 2024-11-26 RX ORDER — METOPROLOL SUCCINATE 25 MG/1
12.5 TABLET, EXTENDED RELEASE ORAL
Qty: 45 TABLET | Refills: 1 | Status: SHIPPED | OUTPATIENT
Start: 2024-11-26 | End: 2025-05-25

## 2024-11-26 ASSESSMENT — ENCOUNTER SYMPTOMS
WOUND: 0
RECTAL PAIN: 0
UNEXPECTED WEIGHT CHANGE: 0
MYALGIAS: 0
PALPITATIONS: 0
ABDOMINAL PAIN: 0
DIFFICULTY URINATING: 0
FREQUENCY: 0
SHORTNESS OF BREATH: 0
HEADACHES: 0
NUMBNESS: 0
CONSTIPATION: 0
TROUBLE SWALLOWING: 0
ARTHRALGIAS: 0
WEAKNESS: 0
POLYPHAGIA: 0
POLYDIPSIA: 0
DIAPHORESIS: 0
CHEST TIGHTNESS: 0
DIARRHEA: 0
NERVOUS/ANXIOUS: 0
NAUSEA: 0
CHILLS: 0
FATIGUE: 0

## 2024-11-26 NOTE — PROGRESS NOTES
Subjective   Patient ID: Joe Romero is a 69 y.o. male who presents for Follow-up (Pt is here today for a 6 month FUV. ).    Patient presents for follow-up evaluation of elevated D-dimer, DVT treatment.  Patient of Dr. Parekh's.  I discussed treatment with his PCP.    Deep vein thrombosis: Patient currently on Plavix for treatment, D-dimer has normalized to 595 based off patient being age-adjusted to 770.  Patient previously had been experiencing gastrointestinal bleeding with combo therapy of Plavix and Eliquis.  Taken off Eliquis by his PCP.  He is currently not taking the Eliquis.  Last labs showed him with no further anemia.  Will continue Plavix therapy.  I did detail all possible side effects with the patient as well as signs and symptoms of gastrointestinal bleeding and other forms of abnormal bleeding that need to be emergently treated and reported.  Patient did verbalize understanding.  He is agreeable to this therapy and understands the risks versus not being on therapy given his past history of DVT and NSTEMI.  He denies any cardiovascular symptoms.  Blood pressure controlled at 138/80.  No pain at DVT site, good circulation.  No signs or symptoms of gastrointestinal bleed.  No hematuria.  No epistaxis.  Patient will follow-up in 3 months with Dr. Parekh for evaluation of CBC/D-dimer.  At that time patient would like to discuss possible removal of IVC filter and risks/benefits.  I have made Dr. Parekh aware of this.               Review of Systems   Constitutional:  Negative for chills, diaphoresis, fatigue and unexpected weight change.   HENT:  Negative for dental problem, tinnitus and trouble swallowing.    Eyes:  Negative for visual disturbance.   Respiratory:  Negative for chest tightness and shortness of breath.    Cardiovascular:  Negative for chest pain, palpitations and leg swelling.   Gastrointestinal:  Negative for abdominal pain, constipation, diarrhea, nausea and rectal pain.   Endocrine:  "Negative for polydipsia, polyphagia and polyuria.   Genitourinary:  Negative for difficulty urinating, frequency and urgency.   Musculoskeletal:  Negative for arthralgias and myalgias.   Skin:  Negative for pallor and wound.   Neurological:  Negative for syncope, weakness, numbness and headaches.   Psychiatric/Behavioral:  Negative for suicidal ideas. The patient is not nervous/anxious.        Objective   /80   Pulse 58   Ht 1.753 m (5' 9\")   Wt 76.7 kg (169 lb 3.2 oz)   SpO2 94%   BMI 24.99 kg/m²     Physical Exam  Vitals and nursing note reviewed.   Constitutional:       General: He is not in acute distress.     Appearance: Normal appearance. He is normal weight.   HENT:      Head: Normocephalic.      Nose: Nose normal.      Mouth/Throat:      Mouth: Mucous membranes are moist.      Pharynx: Oropharynx is clear.   Eyes:      Pupils: Pupils are equal, round, and reactive to light.   Cardiovascular:      Rate and Rhythm: Normal rate and regular rhythm.      Pulses: Normal pulses.      Heart sounds: Normal heart sounds. No murmur heard.  Pulmonary:      Effort: Pulmonary effort is normal. No respiratory distress.      Breath sounds: Normal breath sounds. No stridor. No wheezing or rhonchi.   Abdominal:      General: Bowel sounds are normal.      Palpations: Abdomen is soft.   Musculoskeletal:         General: Normal range of motion.      Cervical back: Normal range of motion.   Skin:     General: Skin is warm and dry.      Capillary Refill: Capillary refill takes less than 2 seconds.   Neurological:      General: No focal deficit present.      Mental Status: He is alert and oriented to person, place, and time. Mental status is at baseline.   Psychiatric:         Mood and Affect: Mood normal.         Behavior: Behavior normal.         Thought Content: Thought content normal.         Judgment: Judgment normal.         Assessment/Plan   Diagnoses and all orders for this visit:  Deep vein thrombosis (DVT) with " pulmonary embolism present on admission (Multi)  -     D-dimer, quantitative; Future  Anemia, unspecified type  -     CBC; Future

## 2024-11-26 NOTE — TELEPHONE ENCOUNTER
Refill request for metroprolol. Patient would like it sent to [facility] pharmacy.   RX pended    LOV:05/22/24  NOV:03/31/25

## 2024-12-20 DIAGNOSIS — F41.9 ANXIETY DISORDER, UNSPECIFIED TYPE: ICD-10-CM

## 2024-12-23 RX ORDER — PAROXETINE HYDROCHLORIDE 20 MG/1
20 TABLET, FILM COATED ORAL DAILY
Qty: 30 TABLET | Refills: 0 | OUTPATIENT
Start: 2024-12-23

## 2025-01-04 DIAGNOSIS — F41.9 ANXIETY DISORDER, UNSPECIFIED TYPE: ICD-10-CM

## 2025-01-06 ENCOUNTER — TELEPHONE (OUTPATIENT)
Dept: PRIMARY CARE | Facility: CLINIC | Age: 70
End: 2025-01-06
Payer: MEDICARE

## 2025-01-06 DIAGNOSIS — F41.9 ANXIETY DISORDER, UNSPECIFIED TYPE: ICD-10-CM

## 2025-01-06 RX ORDER — PAROXETINE HYDROCHLORIDE 20 MG/1
20 TABLET, FILM COATED ORAL DAILY
Qty: 30 TABLET | Refills: 0 | OUTPATIENT
Start: 2025-01-06

## 2025-01-06 RX ORDER — PAROXETINE HYDROCHLORIDE 20 MG/1
20 TABLET, FILM COATED ORAL DAILY
Qty: 30 TABLET | Refills: 2 | Status: SHIPPED | OUTPATIENT
Start: 2025-01-06

## 2025-03-31 ENCOUNTER — APPOINTMENT (OUTPATIENT)
Dept: PRIMARY CARE | Facility: CLINIC | Age: 70
End: 2025-03-31
Payer: MEDICARE

## 2025-03-31 VITALS
HEART RATE: 46 BPM | OXYGEN SATURATION: 93 % | WEIGHT: 174.4 LBS | BODY MASS INDEX: 25.75 KG/M2 | DIASTOLIC BLOOD PRESSURE: 74 MMHG | SYSTOLIC BLOOD PRESSURE: 122 MMHG

## 2025-03-31 DIAGNOSIS — T78.3XXA ANGIOEDEMA, INITIAL ENCOUNTER: Primary | ICD-10-CM

## 2025-03-31 DIAGNOSIS — R12 HEART BURN: ICD-10-CM

## 2025-03-31 DIAGNOSIS — K92.2 UPPER GI BLEED: ICD-10-CM

## 2025-03-31 DIAGNOSIS — F41.9 ANXIETY DISORDER, UNSPECIFIED TYPE: ICD-10-CM

## 2025-03-31 PROCEDURE — 1036F TOBACCO NON-USER: CPT | Performed by: FAMILY MEDICINE

## 2025-03-31 PROCEDURE — 99214 OFFICE O/P EST MOD 30 MIN: CPT | Performed by: FAMILY MEDICINE

## 2025-03-31 PROCEDURE — 1159F MED LIST DOCD IN RCRD: CPT | Performed by: FAMILY MEDICINE

## 2025-03-31 RX ORDER — ASCORBIC ACID 500 MG
500 TABLET ORAL DAILY
Qty: 100 TABLET | Refills: 2 | Status: CANCELLED | OUTPATIENT
Start: 2025-03-31

## 2025-03-31 RX ORDER — BLACK COHOSH ROOT 200 MG
500 CAPSULE ORAL
COMMUNITY
Start: 2025-03-08 | End: 2025-03-31 | Stop reason: WASHOUT

## 2025-03-31 RX ORDER — EPINEPHRINE 0.3 MG/.3ML
1 INJECTION SUBCUTANEOUS AS NEEDED
Qty: 2 EACH | Refills: 0 | Status: SHIPPED | OUTPATIENT
Start: 2025-03-31 | End: 2026-03-31

## 2025-03-31 RX ORDER — FERROUS GLUCONATE 325 MG
38 TABLET ORAL
Qty: 100 TABLET | Refills: 2 | Status: CANCELLED | OUTPATIENT
Start: 2025-03-31

## 2025-03-31 RX ORDER — PANTOPRAZOLE SODIUM 40 MG/1
TABLET, DELAYED RELEASE ORAL
Qty: 100 TABLET | Refills: 3 | Status: SHIPPED | OUTPATIENT
Start: 2025-03-31

## 2025-03-31 RX ORDER — PAROXETINE HYDROCHLORIDE 20 MG/1
20 TABLET, FILM COATED ORAL DAILY
Qty: 90 TABLET | Refills: 1 | Status: SHIPPED | OUTPATIENT
Start: 2025-03-31

## 2025-03-31 ASSESSMENT — ANXIETY QUESTIONNAIRES
7. FEELING AFRAID AS IF SOMETHING AWFUL MIGHT HAPPEN: NOT AT ALL
5. BEING SO RESTLESS THAT IT IS HARD TO SIT STILL: NOT AT ALL
1. FEELING NERVOUS, ANXIOUS, OR ON EDGE: NOT AT ALL
GAD7 TOTAL SCORE: 0
6. BECOMING EASILY ANNOYED OR IRRITABLE: NOT AT ALL
2. NOT BEING ABLE TO STOP OR CONTROL WORRYING: NOT AT ALL
4. TROUBLE RELAXING: NOT AT ALL
3. WORRYING TOO MUCH ABOUT DIFFERENT THINGS: NOT AT ALL

## 2025-03-31 NOTE — PROGRESS NOTES
Subjective   Patient ID: Joe Romero is a 69 y.o. male who presents for GERD (GI bleed), Coronary Artery Disease, and Anxiety (3 mo).    HPI       HTN  well controlled on medications    History of GI bleed had full workup source unknown.  Dvt/PE  left leg in 9/ 2023  has completed 6 months of treatment   * Presence of IVC filter per cardiology March 2025   Okay to proceed with planned retrieval of IVC filter as arranged with Dr. Garcia.  Removed IVC March 2025   Continue clopidogrel (Plavix) and statin therapies.            Cabg AND S/P  MI      Angioedema  since 2019   States he has never had an allergy to peanuts  He has no known allergies.  We tried as needed Benadryl and prednisone this did not help     No breathing problems lip and tongue swell      Last 12 to 24 hours about once a  month      Tried high dose prednisone           Anxiety/ Deression doing well on paxil  does not want  to decrease   Here with dtr who agrees  Review of Systems    Objective   /74   Pulse (!) 46   Wt 79.1 kg (174 lb 6.4 oz)   SpO2 93%   BMI 25.75 kg/m²     Physical Exam  Vitals reviewed.   Constitutional:       Appearance: Normal appearance.   HENT:      Head: Normocephalic and atraumatic.   Eyes:      Conjunctiva/sclera: Conjunctivae normal.   Cardiovascular:      Rate and Rhythm: Normal rate and regular rhythm.   Pulmonary:      Effort: Pulmonary effort is normal.      Breath sounds: Normal breath sounds.   Musculoskeletal:      Cervical back: Neck supple.   Skin:     General: Skin is warm and dry.   Neurological:      General: No focal deficit present.      Mental Status: He is alert and oriented to person, place, and time.   Psychiatric:         Mood and Affect: Mood normal.         Behavior: Behavior normal.         Thought Content: Thought content normal.         Judgment: Judgment normal.       Assessment/Plan   Diagnoses and all orders for this visit:  Angioedema, initial encounter  -     Referral to  Allergy; Future  -     EPINEPHrine (Epipen) 0.3 mg/0.3 mL injection syringe; Inject 0.3 mL (0.3 mg) into the muscle if needed for anaphylaxis. Call 911 after use.  Anxiety disorder, unspecified type  -     PARoxetine (Paxil) 20 mg tablet; Take 1 tablet (20 mg) by mouth once daily.  Heart burn  -     pantoprazole (ProtoNix) 40 mg EC tablet; TAKE ONE TABLET DAILY  Upper GI bleed  -     CBC and Auto Differential; Future  -     Ferritin; Future  -     Comprehensive Metabolic Panel; Future

## 2025-04-28 ENCOUNTER — TELEPHONE (OUTPATIENT)
Dept: PRIMARY CARE | Facility: CLINIC | Age: 70
End: 2025-04-28
Payer: MEDICARE

## 2025-04-28 DIAGNOSIS — R12 HEART BURN: ICD-10-CM

## 2025-04-28 RX ORDER — PANTOPRAZOLE SODIUM 40 MG/1
TABLET, DELAYED RELEASE ORAL
Qty: 100 TABLET | Refills: 1 | Status: SHIPPED | OUTPATIENT
Start: 2025-04-28

## 2025-07-03 ENCOUNTER — APPOINTMENT (OUTPATIENT)
Dept: ALLERGY | Facility: CLINIC | Age: 70
End: 2025-07-03
Payer: MEDICARE

## 2025-09-30 ENCOUNTER — APPOINTMENT (OUTPATIENT)
Dept: PRIMARY CARE | Facility: CLINIC | Age: 70
End: 2025-09-30
Payer: MEDICARE

## 2025-10-23 ENCOUNTER — APPOINTMENT (OUTPATIENT)
Dept: ALLERGY | Facility: CLINIC | Age: 70
End: 2025-10-23
Payer: MEDICARE